# Patient Record
Sex: FEMALE | Race: ASIAN | NOT HISPANIC OR LATINO | URBAN - METROPOLITAN AREA
[De-identification: names, ages, dates, MRNs, and addresses within clinical notes are randomized per-mention and may not be internally consistent; named-entity substitution may affect disease eponyms.]

---

## 2017-06-09 ENCOUNTER — INPATIENT (INPATIENT)
Facility: HOSPITAL | Age: 67
LOS: 1 days | Discharge: HOME | End: 2017-06-11
Attending: INTERNAL MEDICINE | Admitting: INTERNAL MEDICINE
Payer: MEDICAID

## 2017-06-09 VITALS
HEART RATE: 77 BPM | OXYGEN SATURATION: 100 % | TEMPERATURE: 98 F | RESPIRATION RATE: 16 BRPM | DIASTOLIC BLOOD PRESSURE: 75 MMHG | SYSTOLIC BLOOD PRESSURE: 142 MMHG

## 2017-06-09 DIAGNOSIS — R07.9 CHEST PAIN, UNSPECIFIED: ICD-10-CM

## 2017-06-09 DIAGNOSIS — Z29.9 ENCOUNTER FOR PROPHYLACTIC MEASURES, UNSPECIFIED: ICD-10-CM

## 2017-06-09 DIAGNOSIS — I10 ESSENTIAL (PRIMARY) HYPERTENSION: ICD-10-CM

## 2017-06-09 LAB
ALBUMIN SERPL ELPH-MCNC: 4.2 G/DL — SIGNIFICANT CHANGE UP (ref 3.3–5)
ALP SERPL-CCNC: 71 U/L — SIGNIFICANT CHANGE UP (ref 40–120)
ALT FLD-CCNC: 18 U/L — SIGNIFICANT CHANGE UP (ref 4–33)
AST SERPL-CCNC: 25 U/L — SIGNIFICANT CHANGE UP (ref 4–32)
BASOPHILS # BLD AUTO: 0.02 K/UL — SIGNIFICANT CHANGE UP (ref 0–0.2)
BASOPHILS NFR BLD AUTO: 0.3 % — SIGNIFICANT CHANGE UP (ref 0–2)
BILIRUB SERPL-MCNC: 0.7 MG/DL — SIGNIFICANT CHANGE UP (ref 0.2–1.2)
BUN SERPL-MCNC: 8 MG/DL — SIGNIFICANT CHANGE UP (ref 7–23)
CALCIUM SERPL-MCNC: 9.3 MG/DL — SIGNIFICANT CHANGE UP (ref 8.4–10.5)
CHLORIDE SERPL-SCNC: 107 MMOL/L — SIGNIFICANT CHANGE UP (ref 98–107)
CK MB BLD-MCNC: 1.87 NG/ML — SIGNIFICANT CHANGE UP (ref 1–4.7)
CK MB BLD-MCNC: 2.63 NG/ML — SIGNIFICANT CHANGE UP (ref 1–4.7)
CK MB BLD-MCNC: SIGNIFICANT CHANGE UP (ref 0–2.5)
CK SERPL-CCNC: 102 U/L — SIGNIFICANT CHANGE UP (ref 25–170)
CK SERPL-CCNC: 110 U/L — SIGNIFICANT CHANGE UP (ref 25–170)
CO2 SERPL-SCNC: 23 MMOL/L — SIGNIFICANT CHANGE UP (ref 22–31)
CREAT SERPL-MCNC: 0.88 MG/DL — SIGNIFICANT CHANGE UP (ref 0.5–1.3)
EOSINOPHIL # BLD AUTO: 0.14 K/UL — SIGNIFICANT CHANGE UP (ref 0–0.5)
EOSINOPHIL NFR BLD AUTO: 2.4 % — SIGNIFICANT CHANGE UP (ref 0–6)
GLUCOSE SERPL-MCNC: 93 MG/DL — SIGNIFICANT CHANGE UP (ref 70–99)
HCT VFR BLD CALC: 42.7 % — SIGNIFICANT CHANGE UP (ref 34.5–45)
HGB BLD-MCNC: 14 G/DL — SIGNIFICANT CHANGE UP (ref 11.5–15.5)
IMM GRANULOCYTES NFR BLD AUTO: 0.3 % — SIGNIFICANT CHANGE UP (ref 0–1.5)
INR BLD: 1.13 — SIGNIFICANT CHANGE UP (ref 0.88–1.17)
LYMPHOCYTES # BLD AUTO: 1.44 K/UL — SIGNIFICANT CHANGE UP (ref 1–3.3)
LYMPHOCYTES # BLD AUTO: 25.1 % — SIGNIFICANT CHANGE UP (ref 13–44)
MCHC RBC-ENTMCNC: 28.7 PG — SIGNIFICANT CHANGE UP (ref 27–34)
MCHC RBC-ENTMCNC: 32.8 % — SIGNIFICANT CHANGE UP (ref 32–36)
MCV RBC AUTO: 87.5 FL — SIGNIFICANT CHANGE UP (ref 80–100)
MONOCYTES # BLD AUTO: 0.28 K/UL — SIGNIFICANT CHANGE UP (ref 0–0.9)
MONOCYTES NFR BLD AUTO: 4.9 % — SIGNIFICANT CHANGE UP (ref 2–14)
NEUTROPHILS # BLD AUTO: 3.83 K/UL — SIGNIFICANT CHANGE UP (ref 1.8–7.4)
NEUTROPHILS NFR BLD AUTO: 67 % — SIGNIFICANT CHANGE UP (ref 43–77)
PLATELET # BLD AUTO: 198 K/UL — SIGNIFICANT CHANGE UP (ref 150–400)
PMV BLD: 9.9 FL — SIGNIFICANT CHANGE UP (ref 7–13)
POTASSIUM SERPL-MCNC: 4 MMOL/L — SIGNIFICANT CHANGE UP (ref 3.5–5.3)
POTASSIUM SERPL-SCNC: 4 MMOL/L — SIGNIFICANT CHANGE UP (ref 3.5–5.3)
PROT SERPL-MCNC: 7.6 G/DL — SIGNIFICANT CHANGE UP (ref 6–8.3)
PROTHROM AB SERPL-ACNC: 12.7 SEC — SIGNIFICANT CHANGE UP (ref 9.8–13.1)
RBC # BLD: 4.88 M/UL — SIGNIFICANT CHANGE UP (ref 3.8–5.2)
RBC # FLD: 13.2 % — SIGNIFICANT CHANGE UP (ref 10.3–14.5)
SODIUM SERPL-SCNC: 143 MMOL/L — SIGNIFICANT CHANGE UP (ref 135–145)
TROPONIN T SERPL-MCNC: < 0.06 NG/ML — SIGNIFICANT CHANGE UP (ref 0–0.06)
TROPONIN T SERPL-MCNC: < 0.06 NG/ML — SIGNIFICANT CHANGE UP (ref 0–0.06)
WBC # BLD: 5.73 K/UL — SIGNIFICANT CHANGE UP (ref 3.8–10.5)
WBC # FLD AUTO: 5.73 K/UL — SIGNIFICANT CHANGE UP (ref 3.8–10.5)

## 2017-06-09 PROCEDURE — 71020: CPT | Mod: 26

## 2017-06-09 RX ORDER — FOLIC ACID 0.8 MG
1 TABLET ORAL DAILY
Qty: 0 | Refills: 0 | Status: DISCONTINUED | OUTPATIENT
Start: 2017-06-09 | End: 2017-06-11

## 2017-06-09 RX ORDER — ASPIRIN/CALCIUM CARB/MAGNESIUM 324 MG
325 TABLET ORAL DAILY
Qty: 0 | Refills: 0 | Status: DISCONTINUED | OUTPATIENT
Start: 2017-06-09 | End: 2017-06-11

## 2017-06-09 RX ORDER — ASPIRIN/CALCIUM CARB/MAGNESIUM 324 MG
325 TABLET ORAL ONCE
Qty: 0 | Refills: 0 | Status: COMPLETED | OUTPATIENT
Start: 2017-06-09 | End: 2017-06-09

## 2017-06-09 RX ORDER — LOSARTAN POTASSIUM 100 MG/1
100 TABLET, FILM COATED ORAL DAILY
Qty: 0 | Refills: 0 | Status: DISCONTINUED | OUTPATIENT
Start: 2017-06-09 | End: 2017-06-11

## 2017-06-09 RX ORDER — FOLIC ACID 0.8 MG
1 TABLET ORAL
Qty: 0 | Refills: 0 | COMMUNITY

## 2017-06-09 RX ORDER — LOSARTAN POTASSIUM 100 MG/1
1 TABLET, FILM COATED ORAL
Qty: 0 | Refills: 0 | COMMUNITY

## 2017-06-09 RX ORDER — SODIUM CHLORIDE 9 MG/ML
3 INJECTION INTRAMUSCULAR; INTRAVENOUS; SUBCUTANEOUS EVERY 8 HOURS
Qty: 0 | Refills: 0 | Status: DISCONTINUED | OUTPATIENT
Start: 2017-06-09 | End: 2017-06-11

## 2017-06-09 RX ORDER — ALENDRONATE SODIUM 70 MG/1
1 TABLET ORAL
Qty: 0 | Refills: 0 | COMMUNITY

## 2017-06-09 RX ORDER — ENOXAPARIN SODIUM 100 MG/ML
40 INJECTION SUBCUTANEOUS EVERY 24 HOURS
Qty: 0 | Refills: 0 | Status: DISCONTINUED | OUTPATIENT
Start: 2017-06-09 | End: 2017-06-11

## 2017-06-09 RX ADMIN — SODIUM CHLORIDE 3 MILLILITER(S): 9 INJECTION INTRAMUSCULAR; INTRAVENOUS; SUBCUTANEOUS at 23:00

## 2017-06-09 RX ADMIN — Medication 325 MILLIGRAM(S): at 15:02

## 2017-06-09 NOTE — ED PROVIDER NOTE - OBJECTIVE STATEMENT
67 y/o female pmh htn, migraines c/o chest pain and sob x today. Pt admits to having a headache last night, typical for her, which was resolved with tylenol. Pt woke up today and went for a walk, pt was able to 2 walk 2 houses away then developed substernal chest pain and sob. Pain was pressure 67 y/o female pmh htn, migraines c/o chest pain and sob x today. Pt admits to having a headache last night, typical for her, which was resolved with tylenol. Pt woke up today and went for a walk, pt was able to 2 walk 2 houses away then developed substernal chest pain and sob. Pain was pressure like, non radiating, exertional. Pt admits to some relieve of symptoms with lying down. Now c/o mild substernal chest pain, exacerbated by taking deep breaths. Mike palpitations, diaphoresis, n/v/d, numbness, tingling, weakness, dizziness, syncope, fever or chills.  Last stress test was 6 years ago.

## 2017-06-09 NOTE — H&P ADULT - NSHPLABSRESULTS_GEN_ALL_CORE
EKG NSR @ 72b/ min   CE negative x 2  CXR preliminary =clear                         14.0   5.73  )-----------( 198      ( 09 Jun 2017 12:30 )             42.7   06-09    143  |  107  |  8   ----------------------------<  93  4.0   |  23  |  0.88    Ca    9.3      09 Jun 2017 12:30    TPro  7.6  /  Alb  4.2  /  TBili  0.7  /  DBili  x   /  AST  25  /  ALT  18  /  AlkPhos  71  06-09

## 2017-06-09 NOTE — ED ADULT NURSE NOTE - OBJECTIVE STATEMENT
65 y/o female pmh htn, migraines c/o chest pain and sob x today. Pt admits to having a headache last night, typical for her, which was resolved with tylenol. Pt woke up today and went for a walk, pt was able to 2 walk 2 houses away then developed substernal chest pain and sob. Pain was pressure like, non radiating, exertional. Pt admits to some relieve of symptoms with lying down. Now c/o mild substernal chest pain, exacerbated by taking deep breaths. Mike palpitations, diaphoresis, n/v/d, numbness, tingling, weakness, dizziness, syncope, fever or chills.

## 2017-06-09 NOTE — ED PROVIDER NOTE - ATTENDING CONTRIBUTION TO CARE
ED Attending Dr. Munguia: 67 yo female with HTN, chronic headache history, in ED with 1 day of exertional CP/SOB.  Pain described as a pressure and nonradiating.  No N/V/D or abdominal pain.  On exam pt well appearing, in NAD, heart RRR, lungs CTAB, abd NTND, extremities without swelling, strength 5/5 in all extremities and skin without rash.  HEART score 3 but last stress test 6 years ago and pt does not know who her cardiologist is.

## 2017-06-09 NOTE — ED ADULT NURSE NOTE - CAS EDN DISCHARGE ASSESSMENT
Alert and oriented to person, place and time/No adverse reaction to first time med in ED/Patient baseline mental status/Symptoms improved/Awake

## 2017-06-09 NOTE — H&P ADULT - HISTORY OF PRESENT ILLNESS
66F with a history of HTN and Migraines experiencing chest pain and sob.  Pt admits to having a headache last night, typical for her, which was resolved with Tylenol. Pt woke up 6/9 and went for a walk, pt was able to 2 walk 2 houses away then developed substernal chest pain and sob. Pain was pressure like, non radiating, exertional. Pt admits to some relieve of symptoms with lying down. Now c/o mild substernal chest pain, exacerbated by taking deep breaths. Mike palpitations, diaphoresis, nausea, vomit, numbness, tingling, weakness, dizziness, syncope, fever or chills

## 2017-06-10 LAB
BUN SERPL-MCNC: 11 MG/DL — SIGNIFICANT CHANGE UP (ref 7–23)
CALCIUM SERPL-MCNC: 9.4 MG/DL — SIGNIFICANT CHANGE UP (ref 8.4–10.5)
CHLORIDE SERPL-SCNC: 106 MMOL/L — SIGNIFICANT CHANGE UP (ref 98–107)
CHOLEST SERPL-MCNC: 155 MG/DL — SIGNIFICANT CHANGE UP (ref 120–199)
CO2 SERPL-SCNC: 21 MMOL/L — LOW (ref 22–31)
CREAT SERPL-MCNC: 0.78 MG/DL — SIGNIFICANT CHANGE UP (ref 0.5–1.3)
GLUCOSE SERPL-MCNC: 96 MG/DL — SIGNIFICANT CHANGE UP (ref 70–99)
HBA1C BLD-MCNC: 5.8 % — HIGH (ref 4–5.6)
HCT VFR BLD CALC: 42.9 % — SIGNIFICANT CHANGE UP (ref 34.5–45)
HDLC SERPL-MCNC: 52 MG/DL — SIGNIFICANT CHANGE UP (ref 45–65)
HGB BLD-MCNC: 14 G/DL — SIGNIFICANT CHANGE UP (ref 11.5–15.5)
LIPID PNL WITH DIRECT LDL SERPL: 95 MG/DL — SIGNIFICANT CHANGE UP
MCHC RBC-ENTMCNC: 28.5 PG — SIGNIFICANT CHANGE UP (ref 27–34)
MCHC RBC-ENTMCNC: 32.6 % — SIGNIFICANT CHANGE UP (ref 32–36)
MCV RBC AUTO: 87.2 FL — SIGNIFICANT CHANGE UP (ref 80–100)
PLATELET # BLD AUTO: 202 K/UL — SIGNIFICANT CHANGE UP (ref 150–400)
PMV BLD: 9.7 FL — SIGNIFICANT CHANGE UP (ref 7–13)
POTASSIUM SERPL-MCNC: 3.9 MMOL/L — SIGNIFICANT CHANGE UP (ref 3.5–5.3)
POTASSIUM SERPL-SCNC: 3.9 MMOL/L — SIGNIFICANT CHANGE UP (ref 3.5–5.3)
RBC # BLD: 4.92 M/UL — SIGNIFICANT CHANGE UP (ref 3.8–5.2)
RBC # FLD: 13.4 % — SIGNIFICANT CHANGE UP (ref 10.3–14.5)
SODIUM SERPL-SCNC: 142 MMOL/L — SIGNIFICANT CHANGE UP (ref 135–145)
TRIGL SERPL-MCNC: 102 MG/DL — SIGNIFICANT CHANGE UP (ref 10–149)
WBC # BLD: 6.32 K/UL — SIGNIFICANT CHANGE UP (ref 3.8–10.5)
WBC # FLD AUTO: 6.32 K/UL — SIGNIFICANT CHANGE UP (ref 3.8–10.5)

## 2017-06-10 RX ORDER — LANOLIN ALCOHOL/MO/W.PET/CERES
3 CREAM (GRAM) TOPICAL AT BEDTIME
Qty: 0 | Refills: 0 | Status: DISCONTINUED | OUTPATIENT
Start: 2017-06-10 | End: 2017-06-11

## 2017-06-10 RX ADMIN — SODIUM CHLORIDE 3 MILLILITER(S): 9 INJECTION INTRAMUSCULAR; INTRAVENOUS; SUBCUTANEOUS at 05:01

## 2017-06-10 RX ADMIN — SODIUM CHLORIDE 3 MILLILITER(S): 9 INJECTION INTRAMUSCULAR; INTRAVENOUS; SUBCUTANEOUS at 21:48

## 2017-06-10 RX ADMIN — ENOXAPARIN SODIUM 40 MILLIGRAM(S): 100 INJECTION SUBCUTANEOUS at 11:46

## 2017-06-10 RX ADMIN — Medication 325 MILLIGRAM(S): at 11:47

## 2017-06-10 RX ADMIN — Medication 1 TABLET(S): at 11:47

## 2017-06-10 RX ADMIN — SODIUM CHLORIDE 3 MILLILITER(S): 9 INJECTION INTRAMUSCULAR; INTRAVENOUS; SUBCUTANEOUS at 13:56

## 2017-06-10 RX ADMIN — Medication 3 MILLIGRAM(S): at 21:47

## 2017-06-10 RX ADMIN — LOSARTAN POTASSIUM 100 MILLIGRAM(S): 100 TABLET, FILM COATED ORAL at 21:47

## 2017-06-10 RX ADMIN — Medication 1 MILLIGRAM(S): at 11:47

## 2017-06-10 NOTE — PROGRESS NOTE ADULT - ASSESSMENT
-chest pain-r/o ACS-cont.current meds-asa-cardiology consult appreciated.for possible ECHO,Stress test  -HTN-uncontrolled,monitor.cont.losartan  -DVT Prophylaxis  -d/w family at bedside

## 2017-06-10 NOTE — CONSULT NOTE ADULT - SUBJECTIVE AND OBJECTIVE BOX
Patient is a 66y old  Female who presents with a chief complaint of chest pain x 1 day     HPI:  66F with a history of HTN and Migraines experiencing chest pain and sob.  Pt admits to having a headache last night, typical for her, which was resolved with Tylenol. Pt woke up 6/9 and went for a walk, pt was able to 2 walk 2 houses away then developed substernal chest pain and dyspnea.Pain was pressure like, non radiating, exertional. Pt admits to some relieve of symptoms with lying down. Now c/o mild substernal chest pain, exacerbated by taking deep breaths. Mike palpitations, diaphoresis, nausea, vomit, numbness, tingling, weakness, dizziness, syncope, fever or chills. No prior cardiac work-up.    PAST MEDICAL & SURGICAL HISTORY:  HTN (hypertension)  No significant past surgical history      MEDICATIONS  (STANDING):  enoxaparin Injectable 40milliGRAM(s) SubCutaneous every 24 hours  sodium chloride 0.9% lock flush 3milliLiter(s) IV Push every 8 hours  losartan 100milliGRAM(s) Oral daily  calcium carbonate 1250 mG + Vitamin D (OsCal 500 + D) 1Tablet(s) Oral daily  folic acid 1milliGRAM(s) Oral daily  aspirin enteric coated 325milliGRAM(s) Oral daily    MEDICATIONS  (PRN):      FAMILY HISTORY:  No pertinent family history in first degree relatives      SOCIAL HISTORY:    CIGARETTES:Non Smoker    ALCOHOL:Denies    REVIEW OF SYSTEMS:  CONSTITUTIONAL: No fever, weight loss, or fatigue  EYES: No eye pain, visual disturbances, or discharge  ENMT:  No difficulty hearing, tinnitus, vertigo; No sinus or throat pain  NECK: No pain or stiffness  RESPIRATORY: No cough, wheezing, chills or hemoptysis; Had exertional shortness of breath  CARDIOVASCULAR: Substernal pressure like chest pain,No palpitations, dizziness, or leg swelling  GASTROINTESTINAL: No abdominal or epigastric pain. No nausea, vomiting, or hematemesis; No diarrhea or constipation. No melena or hematochezia.  GENITOURINARY: No dysuria, frequency, hematuria, or incontinence  NEUROLOGICAL: Frequent-migraine  headaches,No memory loss, loss of strength, numbness, or tremors  SKIN: No itching, burning, rashes, or lesions   LYMPH NODES: No enlarged glands  ENDOCRINE: No heat or cold intolerance; No hair loss  MUSCULOSKELETAL: No joint pain or swelling; No muscle, back, or extremity pain  PSYCHIATRIC: No depression, anxiety, mood swings, or difficulty sleeping  HEME/LYMPH: No easy bruising, or bleeding gums  ALLERY AND IMMUNOLOGIC: No hives or eczema    Vital Signs Last 24 Hrs  T(C): 36.4, Max: 36.9 (06-09 @ 11:39)  T(F): 97.5, Max: 98.4 (06-09 @ 11:39)  HR: 69 (69 - 99)  BP: 138/80 (127/90 - 161/75)  RR: 16 (16 - 20)  SpO2: 100% (99% - 100%)      PHYSICAL EXAM:  GENERAL: NAD, well-groomed, well-developed  HEAD:  Atraumatic, Normocephalic  EYES: EOMI, PERRLA, conjunctiva and sclera clear  ENMT: No tonsillar erythema, exudates, or enlargement; Moist mucous membranes, Good dentition, No lesions  NECK: Supple, No JVD, Normal thyroid  NERVOUS SYSTEM:  Alert & Oriented X3, Good concentration; Motor Strength 5/5 B/L upper and lower extremities; DTRs 2+ intact and symmetric  CHEST/LUNG: Clear to percussion bilaterally; No rales, rhonchi, wheezing, or rubs  HEART: Regular rate and rhythm; No murmurs, rubs, or gallops  ABDOMEN: Soft, Nontender, Nondistended; Bowel sounds present  EXTREMITIES:  2+ Peripheral Pulses, No clubbing, cyanosis, or edema  LYMPH: No lymphadenopathy noted  SKIN: No rashes or lesions      INTERPRETATION OF TELEMETRY:    ECG: Normal sinus rhythm with sinus arrhythmia,No acute ST,T abnormalities    LABS:                        14.0   6.32  )-----------( 202      ( 10 Ovi 2017 04:35 )             42.9     06-10    142  |  106  |  11  ----------------------------<  96  3.9   |  21<L>  |  0.78    Ca    9.4      10 Ovi 2017 04:35    TPro  7.6  /  Alb  4.2  /  TBili  0.7  /  DBili  x   /  AST  25  /  ALT  18  /  AlkPhos  71  06-09    CARDIAC MARKERS ( 09 Jun 2017 19:45 )  x     / < 0.06 ng/mL / 102 u/L / 1.87 ng/mL / x      CARDIAC MARKERS ( 09 Jun 2017 12:30 )  x     / < 0.06 ng/mL / 110 u/L / 2.63 ng/mL / x          PT/INR - ( 09 Jun 2017 12:30 )   PT: 12.7 SEC;   INR: 1.13         Lipid Panel: Cholesterol, Serum 155  Direct LDL 95  HDL Cholesterol, Serum 52  Triglycerides, Serum 102      RADIOLOGY & ADDITIONAL STUDIES:RAD CHEST PA LAT IMPRESSION: Clesr lungs

## 2017-06-10 NOTE — PROGRESS NOTE ADULT - SUBJECTIVE AND OBJECTIVE BOX
KRISSY ERVIN  66y  Female      Patient is a 66y old  Female who presents with a chief complaint of chest pain x 1 day (09 Jun 2017 22:09)  pt.is comfortable,,no cp,no sob,no fever,no cough    REVIEW OF SYSTEMS:  CONSTITUTIONAL: No fever, weight loss, or fatigue  EYES: No eye pain, visual disturbances, or discharge  ENMT:  No difficulty hearing, tinnitus, vertigo; No sinus or throat pain  NECK: No pain or stiffness  RESPIRATORY: No cough, wheezing, chills or hemoptysis; No shortness of breath  CARDIOVASCULAR: No chest pain, palpitations, dizziness, or leg swelling  GASTROINTESTINAL: No abdominal or epigastric pain. No nausea, vomiting, or hematemesis; No diarrhea or constipation. No melena or hematochezia.  GENITOURINARY: No dysuria, frequency, hematuria, or incontinence  NEUROLOGICAL: No headaches, memory loss, loss of strength, numbness, or tremors  SKIN: No itching, burning, rashes, or lesions   ENDOCRINE: No heat or cold intolerance; No hair loss  MUSCULOSKELETAL: No joint pain or swelling; No muscle, back, or extremity pain  PSYCHIATRIC: No depression, anxiety, mood swings, or difficulty sleeping  HEME/LYMPH: No easy bruising, or bleeding gums        INTERVAL HPI/OVERNIGHT EVENTS:  T(C): 36.5, Max: 36.5 (06-10 @ 21:44)  HR: 66 (66 - 78)  BP: 103/62 (103/62 - 138/80)  RR: 16 (16 - 17)  SpO2: 97% (97% - 100%)  Wt(kg): --  I&O's Summary  I & Os for 24h ending 10 Ovi 2017 07:00  =============================================  IN: 207 ml / OUT: 0 ml / NET: 207 ml    I & Os for current day (as of 10 Ovi 2017 21:55)  =============================================  IN: 207 ml / OUT: 0 ml / NET: 207 ml    T(C): 36.5, Max: 36.5 (06-10 @ 21:44)  HR: 66 (66 - 78)  BP: 103/62 (103/62 - 138/80)  RR: 16 (16 - 17)  SpO2: 97% (97% - 100%)  Wt(kg): --Vital Signs Last 24 Hrs  T(C): 36.5, Max: 36.5 (06-10 @ 21:44)  T(F): 97.7, Max: 97.7 (06-10 @ 21:44)  HR: 66 (66 - 78)  BP: 103/62 (103/62 - 138/80)  BP(mean): --  RR: 16 (16 - 17)  SpO2: 97% (97% - 100%)    LABS:                        14.0   6.32  )-----------( 202      ( 10 Ovi 2017 04:35 )             42.9     06-10    142  |  106  |  11  ----------------------------<  96  3.9   |  21<L>  |  0.78    Ca    9.4      10 Ovi 2017 04:35    TPro  7.6  /  Alb  4.2  /  TBili  0.7  /  DBili  x   /  AST  25  /  ALT  18  /  AlkPhos  71  06-09    PT/INR - ( 09 Jun 2017 12:30 )   PT: 12.7 SEC;   INR: 1.13              CAPILLARY BLOOD GLUCOSE            PAST MEDICAL & SURGICAL HISTORY:  HTN (hypertension)  No significant past surgical history      MEDICATIONS  (STANDING):  enoxaparin Injectable 40milliGRAM(s) SubCutaneous every 24 hours  sodium chloride 0.9% lock flush 3milliLiter(s) IV Push every 8 hours  losartan 100milliGRAM(s) Oral daily  calcium carbonate 1250 mG + Vitamin D (OsCal 500 + D) 1Tablet(s) Oral daily  folic acid 1milliGRAM(s) Oral daily  aspirin enteric coated 325milliGRAM(s) Oral daily    MEDICATIONS  (PRN):  melatonin 3milliGRAM(s) Oral at bedtime PRN Insomnia        RADIOLOGY & ADDITIONAL TESTS:    Imaging Personally Reviewed:  [ ] YES  [ ] NO    Consultant(s) Notes Reviewed:  [ ] YES  [ ] NO    PHYSICAL EXAM:  GENERAL: NAD, well-groomed, well-developed  HEAD:  Atraumatic, Normocephalic  EYES: EOMI, PERRLA, conjunctiva and sclera clear  ENMT: No tonsillar erythema, exudates, or enlargement; Moist mucous membranes, Good dentition, No lesions  NECK: Supple, No JVD, Normal thyroid  NERVOUS SYSTEM:  Alert & Oriented X3, Good concentration; Motor Strength 5/5 B/L upper and lower extremities; DTRs 2+ intact and symmetric  CHEST/LUNG: Clear to percussion bilaterally; No rales, rhonchi, wheezing, or rubs  HEART: Regular rate and rhythm; No murmurs, rubs, or gallops  ABDOMEN: Soft, Nontender, Nondistended; Bowel sounds present  EXTREMITIES:  2+ Peripheral Pulses, No clubbing, cyanosis, or edema  LYMPH: No lymphadenopathy noted  SKIN: No rashes or lesions    Care Discussed with Consultants/Other Providers [ ] YES  [ ] NO  :     Code Status: [] Full Code [] DNR [] DNI [] Goals of Care:   Disposition: [] ICU [] Stroke Unit [] RCU []PCU []Floor [] Discharge Home         ASHLYN MedinaP

## 2017-06-10 NOTE — CONSULT NOTE ADULT - PROBLEM SELECTOR RECOMMENDATION 9
ACS-no evidence of cardiac injury,troponins negative-to schedule for stress test-r/o Ischemic heart disease

## 2017-06-11 VITALS
DIASTOLIC BLOOD PRESSURE: 77 MMHG | SYSTOLIC BLOOD PRESSURE: 131 MMHG | HEART RATE: 68 BPM | RESPIRATION RATE: 17 BRPM | TEMPERATURE: 98 F | OXYGEN SATURATION: 99 %

## 2017-06-11 LAB
ALBUMIN SERPL ELPH-MCNC: 3.8 G/DL — SIGNIFICANT CHANGE UP (ref 3.3–5)
ALP SERPL-CCNC: 72 U/L — SIGNIFICANT CHANGE UP (ref 40–120)
ALT FLD-CCNC: 14 U/L — SIGNIFICANT CHANGE UP (ref 4–33)
AST SERPL-CCNC: 21 U/L — SIGNIFICANT CHANGE UP (ref 4–32)
BILIRUB SERPL-MCNC: 0.4 MG/DL — SIGNIFICANT CHANGE UP (ref 0.2–1.2)
BUN SERPL-MCNC: 13 MG/DL — SIGNIFICANT CHANGE UP (ref 7–23)
CALCIUM SERPL-MCNC: 9 MG/DL — SIGNIFICANT CHANGE UP (ref 8.4–10.5)
CHLORIDE SERPL-SCNC: 105 MMOL/L — SIGNIFICANT CHANGE UP (ref 98–107)
CO2 SERPL-SCNC: 21 MMOL/L — LOW (ref 22–31)
CREAT SERPL-MCNC: 0.74 MG/DL — SIGNIFICANT CHANGE UP (ref 0.5–1.3)
GLUCOSE SERPL-MCNC: 97 MG/DL — SIGNIFICANT CHANGE UP (ref 70–99)
HCT VFR BLD CALC: 43.2 % — SIGNIFICANT CHANGE UP (ref 34.5–45)
HGB BLD-MCNC: 13.5 G/DL — SIGNIFICANT CHANGE UP (ref 11.5–15.5)
MCHC RBC-ENTMCNC: 27.8 PG — SIGNIFICANT CHANGE UP (ref 27–34)
MCHC RBC-ENTMCNC: 31.3 % — LOW (ref 32–36)
MCV RBC AUTO: 88.9 FL — SIGNIFICANT CHANGE UP (ref 80–100)
PLATELET # BLD AUTO: 186 K/UL — SIGNIFICANT CHANGE UP (ref 150–400)
PMV BLD: 9.9 FL — SIGNIFICANT CHANGE UP (ref 7–13)
POTASSIUM SERPL-MCNC: 4.2 MMOL/L — SIGNIFICANT CHANGE UP (ref 3.5–5.3)
POTASSIUM SERPL-SCNC: 4.2 MMOL/L — SIGNIFICANT CHANGE UP (ref 3.5–5.3)
PROT SERPL-MCNC: 7.1 G/DL — SIGNIFICANT CHANGE UP (ref 6–8.3)
RBC # BLD: 4.86 M/UL — SIGNIFICANT CHANGE UP (ref 3.8–5.2)
RBC # FLD: 13.5 % — SIGNIFICANT CHANGE UP (ref 10.3–14.5)
SODIUM SERPL-SCNC: 139 MMOL/L — SIGNIFICANT CHANGE UP (ref 135–145)
WBC # BLD: 5.89 K/UL — SIGNIFICANT CHANGE UP (ref 3.8–10.5)
WBC # FLD AUTO: 5.89 K/UL — SIGNIFICANT CHANGE UP (ref 3.8–10.5)

## 2017-06-11 PROCEDURE — 93306 TTE W/DOPPLER COMPLETE: CPT | Mod: 26

## 2017-06-11 PROCEDURE — 93016 CV STRESS TEST SUPVJ ONLY: CPT | Mod: GC

## 2017-06-11 PROCEDURE — 93018 CV STRESS TEST I&R ONLY: CPT | Mod: GC

## 2017-06-11 PROCEDURE — 78452 HT MUSCLE IMAGE SPECT MULT: CPT | Mod: 26

## 2017-06-11 RX ORDER — ASPIRIN/CALCIUM CARB/MAGNESIUM 324 MG
0 TABLET ORAL
Qty: 0 | Refills: 0 | COMMUNITY
Start: 2017-06-11

## 2017-06-11 RX ADMIN — Medication 1 MILLIGRAM(S): at 11:57

## 2017-06-11 RX ADMIN — ENOXAPARIN SODIUM 40 MILLIGRAM(S): 100 INJECTION SUBCUTANEOUS at 11:57

## 2017-06-11 RX ADMIN — SODIUM CHLORIDE 3 MILLILITER(S): 9 INJECTION INTRAMUSCULAR; INTRAVENOUS; SUBCUTANEOUS at 05:15

## 2017-06-11 RX ADMIN — Medication 325 MILLIGRAM(S): at 11:57

## 2017-06-11 RX ADMIN — SODIUM CHLORIDE 3 MILLILITER(S): 9 INJECTION INTRAMUSCULAR; INTRAVENOUS; SUBCUTANEOUS at 13:29

## 2017-06-11 RX ADMIN — Medication 1 TABLET(S): at 11:57

## 2017-06-11 NOTE — DISCHARGE NOTE ADULT - PLAN OF CARE
Follow up with your primary care physician for further monitoring in 1-2 weeks. Please call to arrange appointment. Maintain adequate control of your blood pressure. Goal BP < 130/80. Continue low sodium diet. atypical- likely not cardiac

## 2017-06-11 NOTE — DISCHARGE NOTE ADULT - HOSPITAL COURSE
66F adnmitted for ACS . Pt was admitted to tele, ruled out for MI, tele with no events.     EKG NSR @ 72b/ min   CE negative x 2  CXR preliminary =clear   Card (Amin):  Chest pain, unspecified type. Recommendation: ACS-no evidence of cardiac injury,troponins negative-to schedule for stress test-r/o Ischemic heart disease.    Echo-. Mitral annular calcification and calcified mitral  leaflets with normal diastolic opening. Mild mitral  regurgitation.. Endocardium not well visualized; grossly normal left  ventricular systolic function.The right ventricle is not well visualized; grossly  normal right ventricular systolic function.Normal tricuspid valve. Mild-moderate tricuspid  regurgitation.Estimated pulmonary artery systolic pressure equals 41  mm Hg, assuming right atrial pressure equals 10  mm Hg,  consistent with mild pulmonary hypertension.    Stress -The left ventricle was normal in size.  Tracer uptake was homogeneous throughout the left  ventricle.  Normal study; no evidence for myocardial infarction or  ischemia.   Post-stress gated wall motion analysis was performed  (LVEF > 70%;LVEDV = 37 ml.), revealing normal LV function.    Case d/w Cardiology and Medicine --> pt cleared for discharge

## 2017-06-11 NOTE — DISCHARGE NOTE ADULT - PATIENT PORTAL LINK FT
“You can access the [facility] Patient Portal, offered by [facility], by registering with the following website: [email or web address]”

## 2017-06-11 NOTE — DISCHARGE NOTE ADULT - CARE PROVIDER_API CALL
Robb Nelson), Internal Medicine  [address] [address] [address]  Phone: [phone]  Fax: [phone]    Mehrdad Prince (SADA), Cardiology  [address] [address] [place] [address]  Phone: [phone]  Fax: [phone]

## 2017-06-11 NOTE — DISCHARGE NOTE ADULT - CARE PLAN
Principal Discharge DX:	Chest pain  Goal:	atypical- likely not cardiac  Instructions for follow-up, activity and diet:	Follow up with your primary care physician for further monitoring in 1-2 weeks. Please call to arrange appointment.  Secondary Diagnosis:	Essential hypertension  Instructions for follow-up, activity and diet:	Maintain adequate control of your blood pressure. Goal BP < 130/80. Continue low sodium diet.

## 2019-09-27 NOTE — PATIENT PROFILE ADULT. - AS [facility]
9/27/2019         RE: Phuong New  [address] [facility] [id number]        Dear Colleague,    Thank you for referring your patient, Phuong New, to the [facility]. Please see a copy of my visit note below.    [facility]  HEMATOLOGY AND ONCOLOGY    FOLLOW-UP VISIT NOTE    PATIENT NAME: Phuong New MRN # [id number]  DATE OF VISIT: Sep 27, 2019 YOB: 1965    REFERRING PROVIDER: Giovani Vang PA-C  [address] [place] [address]         SUBJECTIVE   Phuong New is being followed for polycythemia    Phuong is accompanied by her  at this clinic visit.  She continues to have headaches.  She has itching all over her body.  This is not related to the showers.  She had a similar itching last year in August at about the same time.  She has cough which is worse when she lays down.      PAST MEDICAL HISTORY     Past Medical History:   Diagnosis Date     Arthritis      Hot flashes      Hypercholesteraemia      Hypertension     No cariologist     Obese      Other chronic pain      Sleep apnea     Possible slep apnea         CURRENT OUTPATIENT MEDICATIONS     Current Outpatient Medications   Medication Sig     albuterol (2.5 MG/3ML) 0.083% nebulizer solution Take 1 vial (2.5 mg) by nebulization every 6 hours as needed for shortness of breath / dyspnea or wheezing     albuterol (ALBUTEROL) 108 (90 BASE) MCG/ACT inhaler Inhale 2 puffs into the lungs every 4 hours as needed for shortness of breath / dyspnea     aspirin (ASA) 81 MG EC tablet Take 1 tablet (81 mg) by mouth daily     atorvastatin (LIPITOR) 40 MG tablet Take 1 tablet (40 mg) by mouth daily     ATORVASTATIN CALCIUM PO Take 40 mg by mouth daily     chlorthalidone (HYGROTON) 25 MG tablet Take 1 tablet (25 mg) by mouth daily     gabapentin (NEURONTIN) 600 MG tablet TAKE ONE TABLET BY MOUTH DAILY     irbesartan (AVAPRO) 150 MG tablet Take 1 tablet (150 mg) by mouth At Bedtime     loratadine  "(CLARITIN) 10 MG tablet Take 10 mg by mouth daily     omeprazole (PRILOSEC) 20 MG DR capsule TAKE ONE CAPSULE BY MOUTH ONCE DAILY     fluticasone (FLONASE) 50 MCG/ACT nasal spray Spray 2 sprays into both nostrils daily (Patient not taking: Reported on 9/27/2019)     predniSONE (DELTASONE) 20 MG tablet Take 2 tablets (40 mg) by mouth daily (Patient not taking: Reported on 9/27/2019)     No current facility-administered medications for this visit.         ALLERGIES      Allergies   Allergen Reactions     Lisinopril Cough        REVIEW OF SYSTEMS   As above in the HPI, o/w complete 12-point ROS was negative.     PHYSICAL EXAM   /84   Pulse 92   Temp 98.7  F (37.1  C) (Oral)   Resp 16   Ht 1.6 m (5' 3\")   Wt 95.7 kg (211 lb)   SpO2 94%   BMI 37.38 kg/m     GEN: NAD  HEENT: PERRL, EOMI, no icterus, injection or pallor. Oropharynx is clear.  LYMPHATICs: no cervical or supraclavicular lymphadenopathy; no other abn lymphadenopathy  PULMONARY: clear with good air entry bilaterally  CARDIOVASCULAR: regular, no murmurs, rubs, or gallops  GASTROINTESTINAL: soft, non-tender, non-distended, normal bowel sounds, no hepatosplenomegaly by percussion or palpation  MUSCULOSKELTAL: warm, well perfused, no edema  NEURO: awake, alert and oriented to time place and person, cranial nerves intact - II - XII, no focal neurologic deficits  SKIN: no rashes     LABORATORY AND IMAGING STUDIES     Recent Labs   Lab Test 03/01/19 08/11/16  0626 08/10/16  0540   POTASSIUM 4.0  --   --    CR 0.88  --  0.61   * 87 106*     No results for input(s): MAG, PHOS in the last 23456 hours.  Recent Labs   Lab Test 06/20/19  1331 08/11/16  0626 08/10/16  0540   WBC 6.8  --   --    HGB 17.1* 13.0 13.3     --   --    MCV 94  --   --    NEUTROPHIL 59.1  --   --      Recent Labs   Lab Test 08/06/19  1427 03/01/19   ALT  --  37   AST  --  30     --      Lab Results   Component Value Date    PATH  06/20/2019     Patient Name: YAMIL, " CRYS  MR#: [id number]  Specimen #: [id number]  Collected: 6/20/2019  Received: 6/21/2019  Reported: 6/21/2019 08:33  Ordering Phy(s): MARIAM CAICEDO    For improved result formatting, select 'View Enhanced Report Format' under   Linked Documents section.    TEST(S):  Peripheral Smear Morphology    FINAL DIAGNOSIS:  Peripheral blood morphology:  - Erythrocytosis.    COMMENT:  The clinical findings suggest a reactive process.  However, with   hemoglobin of 17.1 g/dL, a myeloproliferative  neoplasm must be excluded.  Using peripheral blood, next generation   sequencingoncology, myeloproliferative  neoplasm including JAK2, CALR and MPL mutations is recommended.    Electronically signed out by:    Justo Langston M.D.    CLINICAL HISTORY:  54 years old female.  Indication for peripheral blood morphology: Elevated   hematocrit and hemoglobin.  History  of chronic smoking and low oxygen saturation.    PERIPHERAL BLOOD DATA:    Patient Value (Reference Range >18 year old female)  6.8      WBC    (4.0-11.0 x 10*9/L)  5.31     RBC    (3.8-5.2 x 10*12/L)  17.1     HGB    (11.7-15.7 g/dL)  50.0     HCT    (35.0-47.0 %)  94       MCV    (78-100fL)  32.2     MCH    (26.5-33.0 pg)  34.2     MCHC    (31.5-36.5 g/dL)  13.3     RDW    (10.0-15.0 %)  224      PLT    (150-450 x 10*9/L)  2.1      Retic    (0.5-2.0%)    PERIPHERAL BLOOD DIFFERENTIAL  (Reference ranges >18 year old)    Percent  59.1      Neutrophils, segmented and bands  28.6      Lymphocytes  9.2       Monocytes  2.7       Eosinophils  0.4       Basophils    Absolute  4.0      Neutrophils, segmented and bands    (1.6 - 8.3 x 10*9/L)  1.9      Lymphocytes    (0.8 - 5.3 x 10*9/L)  0.6      Monocytes    (0 -1.3 x 10*9/L)  0.2      Eosinophils    (0 - 0.7 x 10*9/L)  0.0      Basophils    (0 - 0.2 x 10*9/L)    PERIPHERAL MORPHOLOGY:    ERYTHROCYTES: The hemoglobin is recorded as 17.1 g/dL.  The erythrocytes   are normocytic normochromic.    LEUKOCYTES: The white  blood cell count is recorded as 6800.  The   differential count is within normal limits.  The neutrophils, monocytes and lymphocytes show mature morphology.  No   left shift, basophilia, monocytosis,  dysplastic changes or blasts are seen.    PLATELETS: The platelet count is recorded as 224,000.  The platelets show   normal size and granulation.  (Dictated by Justo Herron MD 6- @ 8:32AM).    CPT Codes:  A: 53103-FOAS    TESTING LAB LOCATION:  [address] [place]  [id number] [phone]    COLLECTION SITE:  Client:  [facility]  Location:  [facility] (R)        Results for CRYS LOBO (MRN [id number]) as of 9/27/2019 12:54   Ref. Range 8/6/2019 14:27 9/27/2019 12:00   Erythropoietin Latest Ref Range: 4 - 27 mU/mL 15    Ferritin Latest Ref Range: 8 - 252 ng/mL 88    Iron Latest Ref Range: 35 - 180 ug/dL 117    Iron Binding Cap Latest Ref Range: 240 - 430 ug/dL 380    Iron Saturation Index Latest Ref Range: 15 - 46 % 31    Lactate Dehydrogenase Latest Ref Range: 81 - 234 U/L 189 202      ASSESSMENT AND PLAN   1. Polycythemia  2. Chronic cigarette smoker  3. Obesity and possibly obstructive sleep apnea    I again extensively reviewed primary and secondary polycythemia which is a condition of increased red cell number and mass. I explained to her that polycythemia vera is due to the presence of a genetic mutation involving JAK2, CALR or MPN genes.  It is treated with phlebotomies to address hyperviscosity from increased red cell mass. Hyperviscosity of blood in these patients can present as chest and abdominal pain, myalgia and weakness, fatigue, headache, blurred vision, transient loss of vision, paresthesias, slow mentation and/or a sense of depersonalization. Patients often have near normal life expectancies.      There a number of reasons that can impair tissue oxygenation and the increased red cell is to meet the demands for increased oxygen  including cigarette smoking, obstructive sleep apnea, COPD, asthma, valvular heart disease. Phlebotomy is not indicated in secondary polycythemia as the increased red cell counts is due to an increased need of red cells for oxygen exchange. In this setting we have to treat underlying condition when possible.      Phuong is chronic smoker and has been smoking for over 40 yrs now. Her polycythemia could be quite likely be related to her smoking and at least partly contributing to it. She is overweight/obese and does realize that she has to loose weight. Her obesity could also be contributing to her polycythemia.     Unfortunately somehow the next generation sequencing to analyze for her genetic mutations was missed at the last visit.  I will repeat her CBC and also the next generation today.  I will call her with the results.  If she indeed has polycythemia vera then we will have to bring her in for phlebotomies.  It is extremely unlikely that her current symptoms are from her polycythemia vera.  However she is anxious as expected as she does not have a diagnosis for the last 3 to 4 months.    Over 25 min of direct face to face time spent with patient with more than 50% time spent in counseling and coordinating care.      Again, thank you for allowing me to participate in the care of your patient.        Sincerely,        Felipe Mcdaniels MD     (4) no impairment

## 2020-11-23 NOTE — DISCHARGE NOTE ADULT - MEDICATION SUMMARY - MEDICATIONS TO TAKE
I will START or STAY ON the medications listed below when I get home from the hospital:    aspirin 81 mg oral tablet  --  by mouth   -- Indication: For stroke and MI prevention    losartan 100 mg oral tablet  -- 1 tab(s) by mouth once a day  -- Indication: For HTN (hypertension)    alendronate 70 mg oral tablet  -- 1 tab(s) by mouth once every Sunday  -- Indication: For bones    Calcium 500+D oral tablet, chewable  -- 1 tab(s) by mouth once a day  -- Indication: For Calcium defiency    folic acid 1 mg oral tablet  -- 1 tab(s) by mouth once a day  -- Indication: For folic acid defiency
Negative

## 2021-11-13 NOTE — H&P ADULT - ENMT
[facility]    History and Physical - Hospitalist Service       Date of Admission:  2021  Nelly Alaniz,  1940, MRN [id number]  PCP: KEN ANTONIO    Assessment & Plan      Nelly Alaniz is a 81 year old female admitted on 2021. She has history of previous DVT/PE, anticoagulated on Xarelto, sleep apnea, mood disorder presents for evaluation of abdominal pain found to have acute cholecystitis    #Acute cholecystitis  With leukocytosis but no sepsis  General surgery consultation  N.p.o.  Last took her Xarelto yesterday evening, surgical timing to be determined  Pain meds as needed  Zosyn    #Preop exam  Tolerated surgery in the past without complication per patient  Average risk for surgery  Okay to proceed with urgent surgery without further evaluation    #History of DVT/PE  SCDs  Hold home Xarelto for surgery as above    #Mood disorder  Home meds  She notes that anxiety meds sometimes paradoxically make her more anxious    #Elevated blood pressure without diagnosis of hypertension  Probably due to pain, anxiety  Monitor    #MONIKA, not on CPAP       Diet: NPO for Medical/Clinical Reasons Except for: Meds    DVT Prophylaxis: High risk. SCDs    Valle Catheter: Not present  Central Lines: None  Code Status:   DNR/DNI.  Discussed and confirmed with patient    Clinically Significant Risk Factors Present on Admission             # Coagulation Defect: INR = 1.35 (Ref range: 0.85 - 1.15) and/or PTT = N/A on admission, will monitor for bleeding       Disposition Plan   Expected discharge: 2021 recommended to prior living arrangement once GB out.     The patient's care was discussed with the Patient.    Urvashi gEan MD  [facility]  Text page via [employer] Paging/Directory      ______________________________________________________________________    Chief Complaint   Nelly Alaniz is a 81 year old female who presents for abd pain    History of Present  Illness   Patient is a poor historian secondary to somewhat tangential and wants me to know all the details of 1 part of the story before she will move onto the next part of the story.  She indicates pain began 2 evenings ago.  She describes it as an abdominal pain that began to radiate up into her chest which was alarming to her.  She describes tenderness to palpation when she would feel on her chest.  Symptoms have been fluctuating, she had called her clinic a few times but was told to just watch it at home since the pain was gone at the time that she called etc.  She called 911 at least 2 times in the course of all this, the second time ended up being brought to the hospital.  Does endorse chills and has been nauseated today.  Has felt an urge to have a bowel movement but nothing will come out.  No bowel movement few days.  She vomited a small amount yesterday, she forced her self to vomit hoping it would make her feel better which it did for a short time.  She has some urinary urgency that has since resolved.  She endorses increased ankle edema recently. she is otherwise in her usual state of health    Review of Systems    The 10 point Review of Systems is negative other than noted in the HPI or here.    Past Medical History    I have reviewed this patient's medical history and updated it with pertinent information if needed.   Past Medical History:   Diagnosis Date     Depressive disorder      Obesity      Pulmonary emboli (H)        Past Surgical History   I have reviewed this patient's surgical history and updated it with pertinent information if needed.  Past Surgical History:   Procedure Laterality Date     HYSTERECTOMY         Social History   I have reviewed this patient's social history and updated it with pertinent information if needed.  Social History     Tobacco Use     Smoking status: Never Smoker     Smokeless tobacco: None   Substance Use Topics     Alcohol use: Not Currently     Drug use: Never        Family History   I have reviewed this patient's family history and updated it with pertinent information if needed.  Family History   Problem Relation Age of Onset     Cerebrovascular Disease Mother      Cerebrovascular Disease Maternal Grandmother      Cerebrovascular Disease Maternal Aunt        Prior to Admission Medications   None   Per care everywhere, Tylenol, eyedrops, calcium carbonate, multivitamin, Zyrtec, naproxen, Topamax, Wellbutrin, Xarelto    Allergies   No Known Allergies    Physical Exam   Vital Signs: Temp: 98.6  F (37  C) Temp src: Oral BP: (!) 173/81 Pulse: 77   Resp: 13 SpO2: 98 % O2 Device: None (Room air)    Weight: 0 lbs 0 oz    General: in no apparent distress, non-toxic and alert female lying in hospital bed oriented x3  HEENT: Head normocephalic atraumatic, oral mucosa moist. Sclerae anicteric  CV: Regular rhythm, normal rate, no murmurs  Resp: No wheezes, no rales or rhonchi, no focal consolidations  GI: Belly soft, nondistended, nontender, bowel sounds hypoactive  Skin: No rashes or lesions  Extremities: 2+ pitting edema bilateral ankles, wearing compression stockings  Psych: Normal affect, mood dysthymic, mildly anxious  Neuro: CNII-XII grossly intact, moving all 4 extremities    Data   Data reviewed today: I reviewed all medications, new labs and imaging results over the last 24 hours.  EKG personally reviewed shows: NSr rate 73 bpm    Recent Results (from the past 12 hour(s))   Basic metabolic panel    Collection Time: 11/13/21  2:02 PM   Result Value Ref Range    Sodium 139 136 - 145 mmol/L    Potassium 3.6 3.5 - 5.0 mmol/L    Chloride 110 (H) 98 - 107 mmol/L    Carbon Dioxide (CO2) 22 22 - 31 mmol/L    Anion Gap 7 5 - 18 mmol/L    Urea Nitrogen 16 8 - 28 mg/dL    Creatinine 0.82 0.60 - 1.10 mg/dL    Calcium 9.5 8.5 - 10.5 mg/dL    Glucose 125 70 - 125 mg/dL    GFR Estimate 66 >60 mL/min/1.73m2   Hepatic function panel    Collection Time: 11/13/21  2:02 PM   Result Value Ref  Range    Bilirubin Total 0.8 0.0 - 1.0 mg/dL    Bilirubin Direct 0.3 <=0.5 mg/dL    Protein Total 6.7 6.0 - 8.0 g/dL    Albumin 3.7 3.5 - 5.0 g/dL    Alkaline Phosphatase 76 45 - 120 U/L    AST 17 0 - 40 U/L    ALT 16 0 - 45 U/L   Troponin I (now)    Collection Time: 11/13/21  2:02 PM   Result Value Ref Range    Troponin I <0.01 0.00 - 0.29 ng/mL   Lipase    Collection Time: 11/13/21  2:02 PM   Result Value Ref Range    Lipase <9 0 - 52 U/L   CBC with platelets and differential    Collection Time: 11/13/21  2:23 PM   Result Value Ref Range    WBC Count 12.3 (H) 4.0 - 11.0 10e3/uL    RBC Count 4.62 3.80 - 5.20 10e6/uL    Hemoglobin 15.0 11.7 - 15.7 g/dL    Hematocrit 44.9 35.0 - 47.0 %    MCV 97 78 - 100 fL    MCH 32.5 26.5 - 33.0 pg    MCHC 33.4 31.5 - 36.5 g/dL    RDW 12.8 10.0 - 15.0 %    Platelet Count 191 150 - 450 10e3/uL    % Neutrophils 74 %    % Lymphocytes 18 %    % Monocytes 8 %    % Eosinophils 0 %    % Basophils 0 %    % Immature Granulocytes 0 %    NRBCs per 100 WBC 0 <1 /100    Absolute Neutrophils 9.2 (H) 1.6 - 8.3 10e3/uL    Absolute Lymphocytes 2.2 0.8 - 5.3 10e3/uL    Absolute Monocytes 0.9 0.0 - 1.3 10e3/uL    Absolute Eosinophils 0.0 0.0 - 0.7 10e3/uL    Absolute Basophils 0.0 0.0 - 0.2 10e3/uL    Absolute Immature Granulocytes 0.0 <=0.0 10e3/uL    Absolute NRBCs 0.0 10e3/uL   INR    Collection Time: 11/13/21  2:23 PM   Result Value Ref Range    INR 1.35 (H) 0.85 - 1.15        details… detailed exam nose/mouth

## 2023-02-12 NOTE — H&P ADULT - NSHPPOAPULMEMBOLUS_GEN_A_CORE
no You can access the FollowMyHealth Patient Portal offered by [facility] by registering at the following website: [email or web address]. By joining [facility]’s FollowMyHealth portal, you will also be able to view your health information using other applications (apps) compatible with our system.